# Patient Record
Sex: MALE | Race: WHITE | Employment: FULL TIME | URBAN - NONMETROPOLITAN AREA
[De-identification: names, ages, dates, MRNs, and addresses within clinical notes are randomized per-mention and may not be internally consistent; named-entity substitution may affect disease eponyms.]

---

## 2017-07-18 ENCOUNTER — HOSPITAL ENCOUNTER (OUTPATIENT)
Age: 42
Discharge: HOME | End: 2017-07-18
Payer: COMMERCIAL

## 2017-07-18 LAB
ANISOCYTOSIS: ABNORMAL
BASOPHILS # BLD: 0.7 %
BASOPHILS ABSOLUTE: 0.1 THOU/MM3 (ref 0–0.1)
C-REACTIVE PROTEIN: 1.26 MG/DL (ref 0–1)
EOSINOPHIL # BLD: 2.1 %
EOSINOPHILS ABSOLUTE: 0.2 THOU/MM3 (ref 0–0.4)
HCT VFR BLD CALC: 37.8 % (ref 42–52)
HEMOGLOBIN: 12.6 GM/DL (ref 14–18)
LYMPHOCYTES # BLD: 22.5 %
LYMPHOCYTES ABSOLUTE: 2 THOU/MM3 (ref 1–4.8)
MCH RBC QN AUTO: 30.6 PG (ref 27–31)
MCHC RBC AUTO-ENTMCNC: 33.3 GM/DL (ref 33–37)
MCV RBC AUTO: 92 FL (ref 80–94)
MONOCYTES # BLD: 5.7 %
MONOCYTES ABSOLUTE: 0.5 THOU/MM3 (ref 0.4–1.3)
NUCLEATED RED BLOOD CELLS: 0 /100 WBC
PDW BLD-RTO: 16.6 % (ref 11.5–14.5)
PLATELET # BLD: 266 THOU/MM3 (ref 130–400)
PMV BLD AUTO: 8.1 MCM (ref 7.4–10.4)
RBC # BLD: 4.11 MILL/MM3 (ref 4.7–6.1)
RBC # BLD: NORMAL 10*6/UL
SEDIMENTATION RATE, ERYTHROCYTE: 23 MM/HR (ref 0–10)
SEG NEUTROPHILS: 69 %
SEGMENTED NEUTROPHILS ABSOLUTE COUNT: 6.3 THOU/MM3 (ref 1.8–7.7)
WBC # BLD: 9.1 THOU/MM3 (ref 4.8–10.8)

## 2017-07-18 PROCEDURE — 86140 C-REACTIVE PROTEIN: CPT

## 2017-07-18 PROCEDURE — 87205 SMEAR GRAM STAIN: CPT

## 2017-07-18 PROCEDURE — 85025 COMPLETE CBC W/AUTO DIFF WBC: CPT

## 2017-07-18 PROCEDURE — 87077 CULTURE AEROBIC IDENTIFY: CPT

## 2017-07-18 PROCEDURE — 85651 RBC SED RATE NONAUTOMATED: CPT

## 2017-07-18 PROCEDURE — 36415 COLL VENOUS BLD VENIPUNCTURE: CPT

## 2017-07-18 PROCEDURE — 87186 SC STD MICRODIL/AGAR DIL: CPT

## 2017-07-18 PROCEDURE — 87070 CULTURE OTHR SPECIMN AEROBIC: CPT

## 2017-07-20 LAB
AEROBIC CULTURE: ABNORMAL
GRAM STAIN RESULT: ABNORMAL
ORGANISM: ABNORMAL

## 2017-07-25 ENCOUNTER — HOSPITAL ENCOUNTER (OUTPATIENT)
Dept: GENERAL RADIOLOGY | Age: 42
Discharge: HOME | End: 2017-07-25
Payer: COMMERCIAL

## 2017-07-25 ENCOUNTER — HOSPITAL ENCOUNTER (OUTPATIENT)
Age: 42
Discharge: HOME | End: 2017-07-25
Payer: COMMERCIAL

## 2017-07-25 DIAGNOSIS — R52 PAIN: ICD-10-CM

## 2017-07-25 PROCEDURE — 73140 X-RAY EXAM OF FINGER(S): CPT

## 2017-08-08 ENCOUNTER — HOSPITAL ENCOUNTER (OUTPATIENT)
Dept: GENERAL RADIOLOGY | Age: 42
Discharge: HOME | End: 2017-08-08
Payer: COMMERCIAL

## 2017-08-08 ENCOUNTER — HOSPITAL ENCOUNTER (OUTPATIENT)
Age: 42
Discharge: HOME | End: 2017-08-08
Payer: COMMERCIAL

## 2017-08-08 DIAGNOSIS — M79.644 FINGER PAIN, RIGHT: ICD-10-CM

## 2017-08-08 PROCEDURE — 73140 X-RAY EXAM OF FINGER(S): CPT

## 2017-08-29 ENCOUNTER — HOSPITAL ENCOUNTER (OUTPATIENT)
Dept: GENERAL RADIOLOGY | Age: 42
Discharge: HOME | End: 2017-08-29
Payer: COMMERCIAL

## 2017-08-29 ENCOUNTER — HOSPITAL ENCOUNTER (OUTPATIENT)
Age: 42
Discharge: HOME | End: 2017-08-29
Payer: COMMERCIAL

## 2017-08-29 DIAGNOSIS — M79.644 PAIN OF RIGHT MIDDLE FINGER: ICD-10-CM

## 2017-08-29 PROCEDURE — 73140 X-RAY EXAM OF FINGER(S): CPT

## 2021-01-02 ENCOUNTER — HOSPITAL ENCOUNTER (EMERGENCY)
Age: 46
Discharge: HOME | End: 2021-01-03
Attending: EMERGENCY MEDICINE
Payer: COMMERCIAL

## 2021-01-02 DIAGNOSIS — R73.9 HYPERGLYCEMIA: Primary | ICD-10-CM

## 2021-01-02 LAB
ALBUMIN SERPL-MCNC: 4 G/DL (ref 3.5–5.1)
ALP BLD-CCNC: 188 U/L (ref 38–126)
ALT SERPL-CCNC: 53 U/L (ref 11–66)
ANION GAP SERPL CALCULATED.3IONS-SCNC: 16 MEQ/L (ref 8–16)
AST SERPL-CCNC: 28 U/L (ref 5–40)
BASOPHILS # BLD: 0.5 %
BASOPHILS ABSOLUTE: 0 THOU/MM3 (ref 0–0.1)
BETA-HYDROXYBUTYRATE: 9.47 MG/DL (ref 0.2–2.81)
BILIRUB SERPL-MCNC: 0.8 MG/DL (ref 0.3–1.2)
BILIRUBIN URINE: NEGATIVE
BLOOD, URINE: NEGATIVE
BUN BLDV-MCNC: 10 MG/DL (ref 7–22)
CALCIUM SERPL-MCNC: 10.1 MG/DL (ref 8.5–10.5)
CHARACTER, URINE: CLEAR
CHLORIDE BLD-SCNC: 90 MEQ/L (ref 98–111)
CO2: 22 MEQ/L (ref 23–33)
COLOR: YELLOW
CREAT SERPL-MCNC: 0.7 MG/DL (ref 0.4–1.2)
EKG ATRIAL RATE: 77 BPM
EKG P-R INTERVAL: 132 MS
EKG Q-T INTERVAL: 412 MS
EKG QRS DURATION: 90 MS
EKG QTC CALCULATION (BAZETT): 466 MS
EKG R AXIS: 18 DEGREES
EKG T AXIS: 59 DEGREES
EKG VENTRICULAR RATE: 77 BPM
EOSINOPHIL # BLD: 1.8 %
EOSINOPHILS ABSOLUTE: 0.2 THOU/MM3 (ref 0–0.4)
ERYTHROCYTE [DISTWIDTH] IN BLOOD BY AUTOMATED COUNT: 13.7 % (ref 11.5–14.5)
ERYTHROCYTE [DISTWIDTH] IN BLOOD BY AUTOMATED COUNT: 46.1 FL (ref 35–45)
GFR SERPL CREATININE-BSD FRML MDRD: > 90 ML/MIN/1.73M2
GLUCOSE BLD-MCNC: 504 MG/DL (ref 70–108)
GLUCOSE BLD-MCNC: 508 MG/DL (ref 70–108)
GLUCOSE URINE: >= 1000 MG/DL
HCT VFR BLD CALC: 39.9 % (ref 42–52)
HEMOGLOBIN: 13.7 GM/DL (ref 14–18)
IMMATURE GRANS (ABS): 0.04 THOU/MM3 (ref 0–0.07)
IMMATURE GRANULOCYTES: 0.4 %
KETONES, URINE: 15
LEUKOCYTE ESTERASE, URINE: NEGATIVE
LYMPHOCYTES # BLD: 29.4 %
LYMPHOCYTES ABSOLUTE: 2.8 THOU/MM3 (ref 1–4.8)
MCH RBC QN AUTO: 31.4 PG (ref 26–33)
MCHC RBC AUTO-ENTMCNC: 34.3 GM/DL (ref 32.2–35.5)
MCV RBC AUTO: 91.5 FL (ref 80–94)
MONOCYTES # BLD: 5.9 %
MONOCYTES ABSOLUTE: 0.6 THOU/MM3 (ref 0.4–1.3)
NITRITE, URINE: NEGATIVE
NUCLEATED RED BLOOD CELLS: 0 /100 WBC
OSMOLALITY CALCULATION: 278.9 MOSMOL/KG (ref 275–300)
PH UA: 5.5 (ref 5–9)
PH VENOUS: 7.38 (ref 7.31–7.41)
PLATELET # BLD: 235 THOU/MM3 (ref 130–400)
PMV BLD AUTO: 10.7 FL (ref 9.4–12.4)
POTASSIUM REFLEX MAGNESIUM: 4.5 MEQ/L (ref 3.5–5.2)
PROTEIN UA: NEGATIVE
RBC # BLD: 4.36 MILL/MM3 (ref 4.7–6.1)
SEG NEUTROPHILS: 62 %
SEGMENTED NEUTROPHILS ABSOLUTE COUNT: 6 THOU/MM3 (ref 1.8–7.7)
SODIUM BLD-SCNC: 128 MEQ/L (ref 135–145)
SPECIFIC GRAVITY, URINE: > 1.03 (ref 1–1.03)
TOTAL PROTEIN: 7.3 G/DL (ref 6.1–8)
TROPONIN T: < 0.01 NG/ML
UROBILINOGEN, URINE: 0.2 EU/DL (ref 0–1)
WBC # BLD: 9.6 THOU/MM3 (ref 4.8–10.8)

## 2021-01-02 PROCEDURE — 82010 KETONE BODYS QUAN: CPT

## 2021-01-02 PROCEDURE — 2580000003 HC RX 258: Performed by: EMERGENCY MEDICINE

## 2021-01-02 PROCEDURE — 85025 COMPLETE CBC W/AUTO DIFF WBC: CPT

## 2021-01-02 PROCEDURE — 81003 URINALYSIS AUTO W/O SCOPE: CPT

## 2021-01-02 PROCEDURE — 93005 ELECTROCARDIOGRAM TRACING: CPT | Performed by: EMERGENCY MEDICINE

## 2021-01-02 PROCEDURE — 82948 REAGENT STRIP/BLOOD GLUCOSE: CPT

## 2021-01-02 PROCEDURE — 96376 TX/PRO/DX INJ SAME DRUG ADON: CPT

## 2021-01-02 PROCEDURE — 99285 EMERGENCY DEPT VISIT HI MDM: CPT

## 2021-01-02 PROCEDURE — 36415 COLL VENOUS BLD VENIPUNCTURE: CPT

## 2021-01-02 PROCEDURE — 84484 ASSAY OF TROPONIN QUANT: CPT

## 2021-01-02 PROCEDURE — 80053 COMPREHEN METABOLIC PANEL: CPT

## 2021-01-02 PROCEDURE — 82800 BLOOD PH: CPT

## 2021-01-02 PROCEDURE — 96374 THER/PROPH/DIAG INJ IV PUSH: CPT

## 2021-01-02 RX ORDER — 0.9 % SODIUM CHLORIDE 0.9 %
2000 INTRAVENOUS SOLUTION INTRAVENOUS ONCE
Status: COMPLETED | OUTPATIENT
Start: 2021-01-02 | End: 2021-01-03

## 2021-01-02 RX ADMIN — SODIUM CHLORIDE 2000 ML: 9 INJECTION, SOLUTION INTRAVENOUS at 22:37

## 2021-01-03 VITALS
TEMPERATURE: 98 F | RESPIRATION RATE: 20 BRPM | OXYGEN SATURATION: 98 % | HEIGHT: 71 IN | BODY MASS INDEX: 44.1 KG/M2 | HEART RATE: 76 BPM | WEIGHT: 315 LBS | DIASTOLIC BLOOD PRESSURE: 65 MMHG | SYSTOLIC BLOOD PRESSURE: 120 MMHG

## 2021-01-03 LAB
GLUCOSE BLD-MCNC: 282 MG/DL (ref 70–108)
GLUCOSE BLD-MCNC: 294 MG/DL (ref 70–108)

## 2021-01-03 PROCEDURE — 6370000000 HC RX 637 (ALT 250 FOR IP): Performed by: EMERGENCY MEDICINE

## 2021-01-03 PROCEDURE — 82948 REAGENT STRIP/BLOOD GLUCOSE: CPT

## 2021-01-03 RX ADMIN — Medication 6 UNITS: at 01:27

## 2021-01-03 RX ADMIN — INSULIN HUMAN 10 UNITS: 100 INJECTION, SOLUTION PARENTERAL at 00:14

## 2021-01-03 NOTE — ED NOTES
ED nurse-to-nurse bedside report    Chief Complaint   Patient presents with    Hyperglycemia      LOC: alert and orientated to name, place, date  Vital signs   Vitals:    01/02/21 2202 01/02/21 2255 01/03/21 0019 01/03/21 0122   BP:  122/72 116/74 117/63   Pulse:  76 81 79   Resp:  21 19 22   Temp: 98 °F (36.7 °C)      TempSrc: Oral      SpO2:  97% 98% 97%   Weight:       Height:          Pain:    Pain Interventions: None  Pain Goal: 0  Oxygen: NA    Current needs required RA   Telemetry: Yes  LDAs:   Peripheral IV 01/02/21 Right Hand (Active)     Continuous Infusions:   Mobility: Independent  [place] Fall Risk Score: No flowsheet data found.   Fall Interventions: Call light in reach  Report given to: John Miles RN  01/03/21 9202

## 2021-01-03 NOTE — ED NOTES
Pt given 6 units of Insulin per Joanna Espinoza RN. Patient vital signs stable and respirations unlabored. Pt denies needs at this time.       Sandra Hanna RN  01/03/21 0503

## 2021-01-03 NOTE — ED NOTES
Patient resting in bed. Respirations easy and unlabored. No distress noted. Call light within reach.      Billy oMntes RN  01/02/21 0243

## 2021-01-03 NOTE — ED NOTES
Patient medicated per MAR. Patient resting in bed. Respirations easy and unlabored. No distress noted. Call light within reach.      Eugene Villasenor RN  01/03/21 0020

## 2021-01-03 NOTE — ED NOTES
Upon first contact with patient this RN receives bedside shift report Jus Schaffer RN.         Irene Welsh RN  01/03/21 3749

## 2021-01-03 NOTE — ED TRIAGE NOTES
Pt comes in with multiple symptoms. Sine December 23rd he has had dry mouth, blurred vision at times, fatigue, and increased urination. He is prediabetic and is on metformin. He checked his blood glucose at home and it was over 500.  He does not check this regularly

## 2021-04-02 ENCOUNTER — APPOINTMENT (OUTPATIENT)
Dept: GENERAL RADIOLOGY | Age: 46
End: 2021-04-02
Payer: COMMERCIAL

## 2021-04-02 ENCOUNTER — HOSPITAL ENCOUNTER (EMERGENCY)
Age: 46
Discharge: HOME | End: 2021-04-02
Payer: COMMERCIAL

## 2021-04-02 VITALS
WEIGHT: 315 LBS | BODY MASS INDEX: 44.1 KG/M2 | HEART RATE: 73 BPM | SYSTOLIC BLOOD PRESSURE: 150 MMHG | HEIGHT: 71 IN | TEMPERATURE: 98 F | RESPIRATION RATE: 16 BRPM | OXYGEN SATURATION: 99 % | DIASTOLIC BLOOD PRESSURE: 97 MMHG

## 2021-04-02 DIAGNOSIS — S82.431S: Primary | ICD-10-CM

## 2021-04-02 PROCEDURE — 73590 X-RAY EXAM OF LOWER LEG: CPT

## 2021-04-02 PROCEDURE — 6370000000 HC RX 637 (ALT 250 FOR IP): Performed by: NURSE PRACTITIONER

## 2021-04-02 PROCEDURE — 99283 EMERGENCY DEPT VISIT LOW MDM: CPT

## 2021-04-02 PROCEDURE — 73630 X-RAY EXAM OF FOOT: CPT

## 2021-04-02 RX ORDER — HYDROCODONE BITARTRATE AND ACETAMINOPHEN 5; 325 MG/1; MG/1
1 TABLET ORAL ONCE
Status: COMPLETED | OUTPATIENT
Start: 2021-04-03 | End: 2021-04-02

## 2021-04-02 RX ORDER — HYDROCODONE BITARTRATE AND ACETAMINOPHEN 5; 325 MG/1; MG/1
1 TABLET ORAL EVERY 4 HOURS PRN
Qty: 18 TABLET | Refills: 0 | Status: SHIPPED | OUTPATIENT
Start: 2021-04-02 | End: 2021-04-05

## 2021-04-02 RX ORDER — TRAMADOL HYDROCHLORIDE 50 MG/1
50 TABLET ORAL ONCE
Status: COMPLETED | OUTPATIENT
Start: 2021-04-02 | End: 2021-04-02

## 2021-04-02 RX ADMIN — HYDROCODONE BITARTRATE AND ACETAMINOPHEN 1 TABLET: 5; 325 TABLET ORAL at 23:39

## 2021-04-02 RX ADMIN — TRAMADOL HYDROCHLORIDE 50 MG: 50 TABLET, FILM COATED ORAL at 22:33

## 2021-04-02 ASSESSMENT — PAIN DESCRIPTION - PAIN TYPE
TYPE: ACUTE PAIN
TYPE: ACUTE PAIN

## 2021-04-02 ASSESSMENT — PAIN DESCRIPTION - ORIENTATION
ORIENTATION: RIGHT;LOWER
ORIENTATION: RIGHT

## 2021-04-02 ASSESSMENT — PAIN SCALES - GENERAL
PAINLEVEL_OUTOF10: 8

## 2021-04-02 ASSESSMENT — PAIN DESCRIPTION - LOCATION
LOCATION: LEG
LOCATION: LEG

## 2021-04-02 ASSESSMENT — PAIN DESCRIPTION - FREQUENCY: FREQUENCY: CONTINUOUS

## 2021-04-02 ASSESSMENT — PAIN DESCRIPTION - DESCRIPTORS: DESCRIPTORS: BURNING

## 2021-04-03 ASSESSMENT — ENCOUNTER SYMPTOMS
COUGH: 0
SHORTNESS OF BREATH: 0

## 2021-04-03 NOTE — ED NOTES
Pt presents to the ed with c.o right lower leg and heel pain after falling 4-5 feet off a ladder. PT states that he landed on his heel. Denies any LOC or hitting head at this time.       Nate [place]endez[place], [place]  93/83/64 [id number]

## 2021-04-03 NOTE — ED PROVIDER NOTES
[facility] COMPLAINT       Chief Complaint   Patient presents with    Leg Pain     right       Nurses Notes reviewed and I agree except as noted in the HPI. HISTORY OF PRESENT ILLNESS    Modesto Wilkes is a 39 y.o. male who presents to the Emergency Department for the evaluation of right leg pain, heel pain. States that he was working on a ladder today, he was up at about the fifth step of a 6 foot ladder, ladder gave out, he fell sideways and landed on his outstretched right leg. States that he has had pain in his heel shooting up his right leg on the anterior tibial side since fall. Denies pain prior to fall. Has history of left ankle surgery, no surgeries on right ankle. Denies hitting head, denies other complaints. The HPI was provided by the patient. REVIEW OF SYSTEMS     Review of Systems   Constitutional: Negative for chills and fever. Respiratory: Negative for cough and shortness of breath. Cardiovascular: Negative for chest pain and palpitations. Musculoskeletal: Positive for arthralgias (right heel and ankle), gait problem and myalgias. Skin: Negative for rash and wound. PAST MEDICAL HISTORY    has a past medical history of Asthma and Hypertension. SURGICAL HISTORY      has a past surgical history that includes Finger surgery (06/27/2017).     CURRENT MEDICATIONS       Discharge Medication List as of 4/2/2021 11:36 PM      CONTINUE these medications which have NOT CHANGED    Details   sulfamethoxazole-trimethoprim (BACTRIM;SEPTRA) 400-80 MG per tablet Take 1 tablet by mouth 2 times dailyHistorical Med      lisinopril (PRINIVIL;ZESTRIL) 40 MG tablet Take 40 mg by mouth dailyHistorical Med      FLUoxetine (PROZAC) 40 MG capsule Take 40 mg by mouth dailyHistorical Med      busPIRone (BUSPAR) 10 MG tablet Take 10 mg by mouth 2 times dailyHistorical Med             ALLERGIES     is allergic to morphine. FAMILY HISTORY     He indicated that the status of his mother is unknown.   family history includes High Blood Pressure in his mother. SOCIAL HISTORY      reports that he has never smoked. His smokeless tobacco use includes chew. He reports current alcohol use. He reports previous drug use. PHYSICAL EXAM     INITIAL VITALS:  height is 5' 11\" (1.803 m) and weight is 382 lb (173.3 kg) (abnormal). His oral temperature is 98 °F (36.7 °C). His blood pressure is 150/97 (abnormal) and his pulse is 73. His respiration is 16 and oxygen saturation is 99%. Physical Exam  Vitals signs and nursing note reviewed. Constitutional:       General: He is awake. He is not in acute distress. Appearance: Normal appearance. He is well-developed. He is obese. He is not ill-appearing, toxic-appearing or diaphoretic. HENT:      Head: Normocephalic and atraumatic. Nose: Nose normal.      Mouth/Throat:      Mouth: Mucous membranes are moist.      Pharynx: Oropharynx is clear. Eyes:      Extraocular Movements: Extraocular movements intact. Pupils: Pupils are equal, round, and reactive to light. Neck:      Musculoskeletal: Normal range of motion and neck supple. No neck rigidity or muscular tenderness. Cardiovascular:      Rate and Rhythm: Normal rate and regular rhythm. No extrasystoles are present. Pulses: Normal pulses. Heart sounds: Normal heart sounds, S1 normal and S2 normal. Heart sounds not distant. No murmur. No friction rub. No gallop. Pulmonary:      Effort: Pulmonary effort is normal. No tachypnea, bradypnea, accessory muscle usage, prolonged expiration, respiratory distress or retractions. Breath sounds: Normal breath sounds. No stridor. No wheezing, rhonchi or rales. Chest:      Chest wall: No tenderness. Abdominal:      General: Abdomen is flat. Bowel sounds are normal. There is no distension. Palpations: Abdomen is soft.  There is no shifting dullness, hepatomegaly, splenomegaly or mass. Tenderness: There is no abdominal tenderness. Hernia: No hernia is present. Musculoskeletal: Normal range of motion. General: Tenderness and signs of injury present. No swelling or deformity. Right knee: He exhibits no swelling, no effusion, no deformity, no laceration and no erythema. Right lower leg: No edema. Left lower leg: No edema. Legs:    Skin:     General: Skin is warm and dry. Capillary Refill: Capillary refill takes less than 2 seconds. Coloration: Skin is not jaundiced or pale. Neurological:      General: No focal deficit present. Mental Status: He is alert and oriented to person, place, and time. Mental status is at baseline. GCS: GCS eye subscore is 4. GCS verbal subscore is 5. GCS motor subscore is 6. Cranial Nerves: Cranial nerves are intact. Sensory: Sensation is intact. Psychiatric:         Mood and Affect: Mood normal.         Behavior: Behavior normal. Behavior is cooperative. DIFFERENTIAL DIAGNOSIS:   Knee sprain, ankle sprain, tib-fib fracture    DIAGNOSTIC RESULTS     EKG: All EKG's are interpreted by the Emergency Department Physician who either signs or Co-signs this chart in the absence of a cardiologist.    None    RADIOLOGY: non-plainfilm images(s) such as CT, Ultrasound and MRI are read by the radiologist.    XR FOOT RIGHT (MIN 3 VIEWS)   Final Result   1. No radiographic evidence of acute injury to the right foot. This document has been electronically signed by: Rosalio De La Torre MD on    04/02/2021 10:55 PM      XR TIBIA FIBULA RIGHT (2 VIEWS)   Final Result   1. Minimally displaced oblique fracture of the head of the fibula. This document has been electronically signed by:  Rosalio De La Torre MD on    04/02/2021 10:58 PM          LABS:     Labs Reviewed - No data to display    EMERGENCY DEPARTMENT COURSE:   Vitals:    Vitals:    04/02/21 2129   BP: (!) 150/97 Pulse: 73   Resp: 16   Temp: 98 °F (36.7 °C)   TempSrc: Oral   SpO2: 99%   Weight: (!) 382 lb (173.3 kg)   Height: 5' 11\" (1.803 m)       7:19 PM EDT: The patient was seen and evaluated. Patient seen and evaluated    MDM:  Patient evaluated today for fall from ladder and right lower leg pain. His primary pain was in his heel and then radiating up his anterior tibialis on right side. X-ray was completed showing acute oblique fracture of the head of the right fibula. He is able to bear weight however has pain in his heel when he does so. He has his own crutches. I discussed findings with Susana Hsu. Orthopedic PA who agreed with my plan for pain control and outpatient follow-up with orthopedics. Patient instructed to minimize weightbearing, appropriate analgesia provided and patient was discharged in stable condition. CRITICAL CARE:   None    CONSULTS:  Susana Hsu: Orthopedic PA    PROCEDURES:  None    FINAL IMPRESSION      1. Closed displaced oblique fracture of shaft of right fibula, sequela          DISPOSITION/PLAN   Discharge    PATIENT REFERRED TO:  [address] [address]  [phone]  Go to         DISCHARGE MEDICATIONS:  Discharge Medication List as of 4/2/2021 11:36 PM      START taking these medications    Details   HYDROcodone-acetaminophen (NORCO) 5-325 MG per tablet Take 1 tablet by mouth every 4 hours as needed for Pain for up to 3 days. Intended supply: 3 days. Take lowest dose possible to manage pain, Disp-18 tablet, R-0Print             (Please note that portions of this note were completed with a voice recognition program.  Efforts were made to edit the dictations but occasionally words are mis-transcribed.)    The patient was given an opportunity to see the Emergency Attending. The patient voiced understanding that I was a Mid-LevelProvider and was in agreement with being seen independently by myself. Provider:  I personally performed the services described in the documentation, reviewed and edited the documentation which was dictated to the scribe in my presence, and it accurately records my words and actions.     RAEGAN Mcnair CNP, 4/3/21, 7:31 PM       RAEGAN Mcnair CNP  04/03/21 1931